# Patient Record
Sex: FEMALE | Race: WHITE
[De-identification: names, ages, dates, MRNs, and addresses within clinical notes are randomized per-mention and may not be internally consistent; named-entity substitution may affect disease eponyms.]

---

## 2018-01-15 ENCOUNTER — HOSPITAL ENCOUNTER (OUTPATIENT)
Dept: HOSPITAL 15 - LAB | Age: 83
Discharge: HOME | End: 2018-01-15
Attending: INTERNAL MEDICINE
Payer: COMMERCIAL

## 2018-01-15 DIAGNOSIS — E78.5: Primary | ICD-10-CM

## 2018-01-15 LAB
ALBUMIN SERPL-MCNC: 3.8 G/DL (ref 3.4–5)
ALP SERPL-CCNC: 78 U/L (ref 45–117)
ALT SERPL-CCNC: 27 U/L (ref 13–56)
ANION GAP SERPL CALCULATED.3IONS-SCNC: 8 MMOL/L (ref 5–15)
BILIRUB SERPL-MCNC: 0.6 MG/DL (ref 0.2–1)
BUN SERPL-MCNC: 19 MG/DL (ref 7–18)
BUN/CREAT SERPL: 22.9
CALCIUM SERPL-MCNC: 9 MG/DL (ref 8.5–10.1)
CHLORIDE SERPL-SCNC: 108 MMOL/L (ref 98–107)
CHOLEST SERPL-MCNC: 166 MG/DL (ref ?–200)
CO2 SERPL-SCNC: 27 MMOL/L (ref 21–32)
GLUCOSE SERPL-MCNC: 102 MG/DL (ref 74–106)
HCT VFR BLD AUTO: 49.5 % (ref 36–46)
HDLC SERPL-MCNC: 75 MG/DL (ref 40–59)
HGB BLD-MCNC: 16 G/DL (ref 12.2–16.2)
MCH RBC QN AUTO: 30.9 PG (ref 28–32)
MCV RBC AUTO: 95.3 FL (ref 80–100)
NRBC BLD QL AUTO: 0.1 %
POTASSIUM SERPL-SCNC: 3.7 MMOL/L (ref 3.5–5.1)
PROT SERPL-MCNC: 7.1 G/DL (ref 6.4–8.2)
SODIUM SERPL-SCNC: 143 MMOL/L (ref 136–145)
TRIGL SERPL-MCNC: 77 MG/DL (ref ?–150)

## 2018-01-15 PROCEDURE — 80053 COMPREHEN METABOLIC PANEL: CPT

## 2018-01-15 PROCEDURE — 36415 COLL VENOUS BLD VENIPUNCTURE: CPT

## 2018-01-15 PROCEDURE — 84443 ASSAY THYROID STIM HORMONE: CPT

## 2018-01-15 PROCEDURE — 80061 LIPID PANEL: CPT

## 2018-01-15 PROCEDURE — 81001 URINALYSIS AUTO W/SCOPE: CPT

## 2018-01-15 PROCEDURE — 85025 COMPLETE CBC W/AUTO DIFF WBC: CPT

## 2018-09-26 ENCOUNTER — HOSPITAL ENCOUNTER (OUTPATIENT)
Dept: HOSPITAL 15 - LAB | Age: 83
Discharge: HOME | End: 2018-09-26
Attending: NURSE PRACTITIONER
Payer: COMMERCIAL

## 2018-09-26 DIAGNOSIS — E55.9: ICD-10-CM

## 2018-09-26 DIAGNOSIS — E78.5: Primary | ICD-10-CM

## 2018-09-26 LAB
ALBUMIN SERPL-MCNC: 3.5 G/DL (ref 3.4–5)
ALP SERPL-CCNC: 77 U/L (ref 45–117)
ALT SERPL-CCNC: 24 U/L (ref 13–56)
ANION GAP SERPL CALCULATED.3IONS-SCNC: 7 MMOL/L (ref 5–15)
BILIRUB SERPL-MCNC: 0.7 MG/DL (ref 0.2–1)
BUN SERPL-MCNC: 12 MG/DL (ref 7–18)
BUN/CREAT SERPL: 15.2
CALCIUM SERPL-MCNC: 9.2 MG/DL (ref 8.5–10.1)
CHLORIDE SERPL-SCNC: 109 MMOL/L (ref 98–107)
CHOLEST SERPL-MCNC: 163 MG/DL (ref ?–200)
CO2 SERPL-SCNC: 27 MMOL/L (ref 21–32)
GLUCOSE SERPL-MCNC: 89 MG/DL (ref 74–106)
HCT VFR BLD AUTO: 49.5 % (ref 36–46)
HDLC SERPL-MCNC: 70 MG/DL (ref 40–59)
HGB BLD-MCNC: 16.4 G/DL (ref 12.2–16.2)
MCH RBC QN AUTO: 31.4 PG (ref 28–32)
MCV RBC AUTO: 94.4 FL (ref 80–100)
NRBC BLD QL AUTO: 0.1 %
POTASSIUM SERPL-SCNC: 3.3 MMOL/L (ref 3.5–5.1)
PROT SERPL-MCNC: 7 G/DL (ref 6.4–8.2)
SODIUM SERPL-SCNC: 143 MMOL/L (ref 136–145)
TRIGL SERPL-MCNC: 54 MG/DL (ref ?–150)

## 2018-09-26 PROCEDURE — 82306 VITAMIN D 25 HYDROXY: CPT

## 2018-09-26 PROCEDURE — 36415 COLL VENOUS BLD VENIPUNCTURE: CPT

## 2018-09-26 PROCEDURE — 85025 COMPLETE CBC W/AUTO DIFF WBC: CPT

## 2018-09-26 PROCEDURE — 83036 HEMOGLOBIN GLYCOSYLATED A1C: CPT

## 2018-09-26 PROCEDURE — 84443 ASSAY THYROID STIM HORMONE: CPT

## 2018-09-26 PROCEDURE — 80053 COMPREHEN METABOLIC PANEL: CPT

## 2018-09-26 PROCEDURE — 80061 LIPID PANEL: CPT

## 2019-03-13 ENCOUNTER — HOSPITAL ENCOUNTER (OUTPATIENT)
Dept: HOSPITAL 15 - LAB | Age: 84
Discharge: HOME | End: 2019-03-13
Attending: NURSE PRACTITIONER
Payer: COMMERCIAL

## 2019-03-13 DIAGNOSIS — E78.5: Primary | ICD-10-CM

## 2019-03-13 LAB
ALBUMIN SERPL-MCNC: 3.4 G/DL (ref 3.4–5)
ALP SERPL-CCNC: 80 U/L (ref 45–117)
ALT SERPL-CCNC: 25 U/L (ref 13–56)
ANION GAP SERPL CALCULATED.3IONS-SCNC: 6 MMOL/L (ref 5–15)
BILIRUB SERPL-MCNC: 0.9 MG/DL (ref 0.2–1)
BUN SERPL-MCNC: 13 MG/DL (ref 7–18)
BUN/CREAT SERPL: 16.7
CALCIUM SERPL-MCNC: 9 MG/DL (ref 8.5–10.1)
CHLORIDE SERPL-SCNC: 108 MMOL/L (ref 98–107)
CHOLEST SERPL-MCNC: 176 MG/DL (ref ?–200)
CO2 SERPL-SCNC: 28 MMOL/L (ref 21–32)
GLUCOSE SERPL-MCNC: 96 MG/DL (ref 74–106)
HCT VFR BLD AUTO: 48.9 % (ref 36–46)
HDLC SERPL-MCNC: 81 MG/DL (ref 40–59)
HGB BLD-MCNC: 16.2 G/DL (ref 12.2–16.2)
MCH RBC QN AUTO: 30.9 PG (ref 28–32)
MCV RBC AUTO: 93.3 FL (ref 80–100)
NRBC BLD QL AUTO: 0 %
POTASSIUM SERPL-SCNC: 3.4 MMOL/L (ref 3.5–5.1)
PROT SERPL-MCNC: 6.9 G/DL (ref 6.4–8.2)
SODIUM SERPL-SCNC: 142 MMOL/L (ref 136–145)
TRIGL SERPL-MCNC: 66 MG/DL (ref ?–150)

## 2019-03-13 PROCEDURE — 80053 COMPREHEN METABOLIC PANEL: CPT

## 2019-03-13 PROCEDURE — 85025 COMPLETE CBC W/AUTO DIFF WBC: CPT

## 2019-03-13 PROCEDURE — 36415 COLL VENOUS BLD VENIPUNCTURE: CPT

## 2019-03-13 PROCEDURE — 81001 URINALYSIS AUTO W/SCOPE: CPT

## 2019-03-13 PROCEDURE — 80061 LIPID PANEL: CPT

## 2019-03-13 PROCEDURE — 83036 HEMOGLOBIN GLYCOSYLATED A1C: CPT

## 2019-03-13 PROCEDURE — 84443 ASSAY THYROID STIM HORMONE: CPT

## 2019-06-25 ENCOUNTER — HOSPITAL ENCOUNTER (OUTPATIENT)
Dept: HOSPITAL 15 - LAB | Age: 84
Discharge: HOME | End: 2019-06-25
Attending: NURSE PRACTITIONER
Payer: COMMERCIAL

## 2019-06-25 DIAGNOSIS — E78.5: Primary | ICD-10-CM

## 2019-06-25 LAB
ALBUMIN SERPL-MCNC: 3.6 G/DL (ref 3.4–5)
ALP SERPL-CCNC: 70 U/L (ref 45–117)
ALT SERPL-CCNC: 22 U/L (ref 13–56)
ANION GAP SERPL CALCULATED.3IONS-SCNC: 7 MMOL/L (ref 5–15)
BILIRUB SERPL-MCNC: 1 MG/DL (ref 0.2–1)
BUN SERPL-MCNC: 12 MG/DL (ref 7–18)
BUN/CREAT SERPL: 15
CALCIUM SERPL-MCNC: 9.6 MG/DL (ref 8.5–10.1)
CHLORIDE SERPL-SCNC: 105 MMOL/L (ref 98–107)
CHOLEST SERPL-MCNC: 174 MG/DL (ref ?–200)
CO2 SERPL-SCNC: 28 MMOL/L (ref 21–32)
GLUCOSE SERPL-MCNC: 111 MG/DL (ref 74–106)
HCT VFR BLD AUTO: 48.3 % (ref 36–46)
HDLC SERPL-MCNC: 86 MG/DL (ref 40–59)
HGB BLD-MCNC: 16.1 G/DL (ref 12.2–16.2)
MCH RBC QN AUTO: 31 PG (ref 28–32)
MCV RBC AUTO: 93.1 FL (ref 80–100)
NRBC BLD QL AUTO: 0.1 %
POTASSIUM SERPL-SCNC: 3.2 MMOL/L (ref 3.5–5.1)
PROT SERPL-MCNC: 6.9 G/DL (ref 6.4–8.2)
SODIUM SERPL-SCNC: 140 MMOL/L (ref 136–145)
TRIGL SERPL-MCNC: 71 MG/DL (ref ?–150)

## 2019-06-25 PROCEDURE — 82607 VITAMIN B-12: CPT

## 2019-06-25 PROCEDURE — 36415 COLL VENOUS BLD VENIPUNCTURE: CPT

## 2019-06-25 PROCEDURE — 82306 VITAMIN D 25 HYDROXY: CPT

## 2019-06-25 PROCEDURE — 80053 COMPREHEN METABOLIC PANEL: CPT

## 2019-06-25 PROCEDURE — 81001 URINALYSIS AUTO W/SCOPE: CPT

## 2019-06-25 PROCEDURE — 82746 ASSAY OF FOLIC ACID SERUM: CPT

## 2019-06-25 PROCEDURE — 85025 COMPLETE CBC W/AUTO DIFF WBC: CPT

## 2019-06-25 PROCEDURE — 80061 LIPID PANEL: CPT

## 2019-06-25 PROCEDURE — 83036 HEMOGLOBIN GLYCOSYLATED A1C: CPT

## 2019-10-01 ENCOUNTER — HOSPITAL ENCOUNTER (OUTPATIENT)
Dept: HOSPITAL 15 - LAB | Age: 84
Discharge: HOME | End: 2019-10-01
Attending: NURSE PRACTITIONER
Payer: COMMERCIAL

## 2019-10-01 DIAGNOSIS — R73.03: ICD-10-CM

## 2019-10-01 DIAGNOSIS — E78.5: Primary | ICD-10-CM

## 2019-10-01 LAB
ALBUMIN SERPL-MCNC: 3.6 G/DL (ref 3.4–5)
ALP SERPL-CCNC: 78 U/L (ref 45–117)
ALT SERPL-CCNC: 28 U/L (ref 13–56)
ANION GAP SERPL CALCULATED.3IONS-SCNC: 6 MMOL/L (ref 5–15)
BILIRUB SERPL-MCNC: 0.9 MG/DL (ref 0.2–1)
BUN SERPL-MCNC: 19 MG/DL (ref 7–18)
BUN/CREAT SERPL: 20.2
CALCIUM SERPL-MCNC: 9.4 MG/DL (ref 8.5–10.1)
CHLORIDE SERPL-SCNC: 108 MMOL/L (ref 98–107)
CO2 SERPL-SCNC: 28 MMOL/L (ref 21–32)
GLUCOSE SERPL-MCNC: 114 MG/DL (ref 74–106)
HCT VFR BLD AUTO: 47.6 % (ref 36–46)
HGB BLD-MCNC: 16.5 G/DL (ref 12.2–16.2)
MCH RBC QN AUTO: 32 PG (ref 28–32)
MCV RBC AUTO: 92.1 FL (ref 80–100)
NRBC BLD QL AUTO: 0.1 %
POTASSIUM SERPL-SCNC: 3.6 MMOL/L (ref 3.5–5.1)
PROT SERPL-MCNC: 7.1 G/DL (ref 6.4–8.2)
SODIUM SERPL-SCNC: 142 MMOL/L (ref 136–145)

## 2019-10-01 PROCEDURE — 80053 COMPREHEN METABOLIC PANEL: CPT

## 2019-10-01 PROCEDURE — 83036 HEMOGLOBIN GLYCOSYLATED A1C: CPT

## 2019-10-01 PROCEDURE — 85025 COMPLETE CBC W/AUTO DIFF WBC: CPT

## 2019-10-01 PROCEDURE — 84443 ASSAY THYROID STIM HORMONE: CPT

## 2019-10-01 PROCEDURE — 36415 COLL VENOUS BLD VENIPUNCTURE: CPT

## 2019-11-18 ENCOUNTER — HOSPITAL ENCOUNTER (INPATIENT)
Dept: HOSPITAL 15 - ER | Age: 84
LOS: 3 days | Discharge: HOME | DRG: 189 | End: 2019-11-21
Attending: INTERNAL MEDICINE | Admitting: NURSE PRACTITIONER
Payer: COMMERCIAL

## 2019-11-18 VITALS — WEIGHT: 134.48 LBS | BODY MASS INDEX: 21.11 KG/M2 | HEIGHT: 67 IN

## 2019-11-18 DIAGNOSIS — R65.10: ICD-10-CM

## 2019-11-18 DIAGNOSIS — D69.6: ICD-10-CM

## 2019-11-18 DIAGNOSIS — E07.9: ICD-10-CM

## 2019-11-18 DIAGNOSIS — I48.20: ICD-10-CM

## 2019-11-18 DIAGNOSIS — E78.00: ICD-10-CM

## 2019-11-18 DIAGNOSIS — E78.5: ICD-10-CM

## 2019-11-18 DIAGNOSIS — M19.90: ICD-10-CM

## 2019-11-18 DIAGNOSIS — I10: ICD-10-CM

## 2019-11-18 DIAGNOSIS — G93.41: ICD-10-CM

## 2019-11-18 DIAGNOSIS — Z86.73: ICD-10-CM

## 2019-11-18 DIAGNOSIS — J96.21: Primary | ICD-10-CM

## 2019-11-18 DIAGNOSIS — Z79.01: ICD-10-CM

## 2019-11-18 DIAGNOSIS — Z79.899: ICD-10-CM

## 2019-11-18 LAB
ALBUMIN SERPL-MCNC: 3.9 G/DL (ref 3.4–5)
ALP SERPL-CCNC: 71 U/L (ref 45–117)
ALT SERPL-CCNC: 33 U/L (ref 13–56)
ANION GAP SERPL CALCULATED.3IONS-SCNC: 7 MMOL/L (ref 5–15)
APTT PPP: 37.6 SEC (ref 23.64–32.05)
BILIRUB SERPL-MCNC: 0.6 MG/DL (ref 0.2–1)
BUN SERPL-MCNC: 14 MG/DL (ref 7–18)
BUN/CREAT SERPL: 14.4
CALCIUM SERPL-MCNC: 9 MG/DL (ref 8.5–10.1)
CHLORIDE SERPL-SCNC: 99 MMOL/L (ref 98–107)
CO2 SERPL-SCNC: 30 MMOL/L (ref 21–32)
GLUCOSE SERPL-MCNC: 125 MG/DL (ref 74–106)
HCT VFR BLD AUTO: 49.2 % (ref 36–46)
HGB BLD-MCNC: 16.5 G/DL (ref 12.2–16.2)
INR PPP: 1.78 (ref 0.9–1.15)
MCH RBC QN AUTO: 31.3 PG (ref 28–32)
MCV RBC AUTO: 93.4 FL (ref 80–100)
NRBC BLD QL AUTO: 0.1 %
POTASSIUM SERPL-SCNC: 3.5 MMOL/L (ref 3.5–5.1)
PROT SERPL-MCNC: 7.8 G/DL (ref 6.4–8.2)
SODIUM SERPL-SCNC: 136 MMOL/L (ref 136–145)

## 2019-11-18 PROCEDURE — 83880 ASSAY OF NATRIURETIC PEPTIDE: CPT

## 2019-11-18 PROCEDURE — 36600 WITHDRAWAL OF ARTERIAL BLOOD: CPT

## 2019-11-18 PROCEDURE — 36415 COLL VENOUS BLD VENIPUNCTURE: CPT

## 2019-11-18 PROCEDURE — 80048 BASIC METABOLIC PNL TOTAL CA: CPT

## 2019-11-18 PROCEDURE — 87040 BLOOD CULTURE FOR BACTERIA: CPT

## 2019-11-18 PROCEDURE — 85025 COMPLETE CBC W/AUTO DIFF WBC: CPT

## 2019-11-18 PROCEDURE — 85730 THROMBOPLASTIN TIME PARTIAL: CPT

## 2019-11-18 PROCEDURE — 85610 PROTHROMBIN TIME: CPT

## 2019-11-18 PROCEDURE — 87086 URINE CULTURE/COLONY COUNT: CPT

## 2019-11-18 PROCEDURE — 84484 ASSAY OF TROPONIN QUANT: CPT

## 2019-11-18 PROCEDURE — 80053 COMPREHEN METABOLIC PANEL: CPT

## 2019-11-18 PROCEDURE — 70450 CT HEAD/BRAIN W/O DYE: CPT

## 2019-11-18 PROCEDURE — 83605 ASSAY OF LACTIC ACID: CPT

## 2019-11-18 PROCEDURE — 93005 ELECTROCARDIOGRAM TRACING: CPT

## 2019-11-18 PROCEDURE — 81001 URINALYSIS AUTO W/SCOPE: CPT

## 2019-11-18 PROCEDURE — 82805 BLOOD GASES W/O2 SATURATION: CPT

## 2019-11-18 PROCEDURE — 71045 X-RAY EXAM CHEST 1 VIEW: CPT

## 2019-11-19 VITALS — DIASTOLIC BLOOD PRESSURE: 73 MMHG | SYSTOLIC BLOOD PRESSURE: 134 MMHG

## 2019-11-19 VITALS — DIASTOLIC BLOOD PRESSURE: 51 MMHG | SYSTOLIC BLOOD PRESSURE: 98 MMHG

## 2019-11-19 VITALS — DIASTOLIC BLOOD PRESSURE: 61 MMHG | SYSTOLIC BLOOD PRESSURE: 117 MMHG

## 2019-11-19 VITALS — SYSTOLIC BLOOD PRESSURE: 106 MMHG | DIASTOLIC BLOOD PRESSURE: 62 MMHG

## 2019-11-19 VITALS — SYSTOLIC BLOOD PRESSURE: 127 MMHG | DIASTOLIC BLOOD PRESSURE: 51 MMHG

## 2019-11-19 VITALS — DIASTOLIC BLOOD PRESSURE: 52 MMHG | SYSTOLIC BLOOD PRESSURE: 118 MMHG

## 2019-11-19 RX ADMIN — CEFTRIAXONE SODIUM SCH MLS/HR: 1 INJECTION, POWDER, FOR SOLUTION INTRAMUSCULAR; INTRAVENOUS at 03:45

## 2019-11-19 RX ADMIN — LEVOTHYROXINE SODIUM SCH MCG: 25 TABLET ORAL at 08:34

## 2019-11-19 RX ADMIN — ACETAMINOPHEN PRN MG: 325 TABLET ORAL at 08:36

## 2019-11-19 RX ADMIN — DOXYCYCLINE SCH MG: 100 TABLET ORAL at 11:49

## 2019-11-19 RX ADMIN — METOPROLOL SUCCINATE SCH MG: 50 TABLET, EXTENDED RELEASE ORAL at 09:52

## 2019-11-19 RX ADMIN — FAMOTIDINE SCH MG: 20 TABLET, FILM COATED ORAL at 09:51

## 2019-11-19 RX ADMIN — ACETAMINOPHEN PRN MG: 325 TABLET ORAL at 14:57

## 2019-11-19 RX ADMIN — ATORVASTATIN CALCIUM SCH MG: 20 TABLET, FILM COATED ORAL at 21:36

## 2019-11-19 RX ADMIN — LEVOTHYROXINE SODIUM SCH MCG: 100 TABLET ORAL at 08:34

## 2019-11-19 RX ADMIN — DOXYCYCLINE SCH MG: 100 TABLET ORAL at 21:36

## 2019-11-19 NOTE — NUR
Respiratory note: ASSESSED PT FOR PRN MED NEB AT THIS TIME, PT DENIES SOB AT THIS TIME, NO 
RESP DISTRESS NOTED, NO TX INDICATED. PULSE OX 93% ON 3LNC, HR 73, RR 20, BILATERAL BS 
CLEAR.

## 2019-11-19 NOTE — NUR
URINE OUTPUT

TOTAL URINARY BAG OUTPUT - 8250ML 

CBI IRRIGANT INFUSED          - 6000ML

NET URINE OUTPUT  - 2250ML

-------------------------------------------------------------------------------

Addendum: 11/19/19 at 0644 by SJ TELLEZ RN RN

-------------------------------------------------------------------------------

WRONG ENTRY

## 2019-11-19 NOTE — NUR
Temp Remains Elevated

   The patient temperature remain around 100.0. Despite ice packs I could only get her temp 
as low as 99.5. I found her asleep without the ice packs, checked her temp at 1450hrs, and 
she increased to 102.1. Gave the patient another dose of Tylenol and replaced the ice packs. 
Will continue to monitor.

## 2019-11-19 NOTE — NUR
MS admit from ER

MARIUM MARI admitted to tele/MS after SBAR received.  Patient oriented to SJ TELLEZ, RN primary RN, unit, room, bed, and unit policies regarding patient care and visiting 
hours. Patient weighed by bedscale and encouraged to call if they need something. All 
questions and concerns addressed, patient verbalized understanding. 

Note:

## 2019-11-19 NOTE — NUR
Elevated Temp

   The patient had an elevate temp of 101.2 this morning. Gave the patient Tylenol and 
reassess at 0930, temp lowered slightly to 101.1. Started cooling measures and at 1030hrs, 
the patient's temp decreased to 99.5. Dr. Juarez was informed. Will continue to monitor.

## 2019-11-19 NOTE — NUR
Normal Temp

   The patient's temperature finally came down to normal range. It was 98.4 at 1700hrs. 
Removed the cooling measures and informed the patient we will continue to monitor regularly.

## 2019-11-19 NOTE — NUR
Opening Shift Note

Assumed care of patient, awake, AAOx4.  No S/S of distress/SOB or pain.  On 4L oxygen via 
nasal cannula, Ambulatory to bedside commode.  Bed in lowest locked position, side rails up 
x2, call light within reach.  Instructed on POC and to call for assist PRN, will continue to 
monitor for changes Q1hr and PRN.

## 2019-11-20 VITALS — SYSTOLIC BLOOD PRESSURE: 99 MMHG | DIASTOLIC BLOOD PRESSURE: 49 MMHG

## 2019-11-20 VITALS — DIASTOLIC BLOOD PRESSURE: 61 MMHG | SYSTOLIC BLOOD PRESSURE: 104 MMHG

## 2019-11-20 VITALS — DIASTOLIC BLOOD PRESSURE: 51 MMHG | SYSTOLIC BLOOD PRESSURE: 99 MMHG

## 2019-11-20 VITALS — DIASTOLIC BLOOD PRESSURE: 62 MMHG | SYSTOLIC BLOOD PRESSURE: 119 MMHG

## 2019-11-20 LAB
ANION GAP SERPL CALCULATED.3IONS-SCNC: 10 MMOL/L (ref 5–15)
BUN SERPL-MCNC: 11 MG/DL (ref 7–18)
BUN/CREAT SERPL: 17.5
CALCIUM SERPL-MCNC: 8.2 MG/DL (ref 8.5–10.1)
CHLORIDE SERPL-SCNC: 102 MMOL/L (ref 98–107)
CO2 SERPL-SCNC: 26 MMOL/L (ref 21–32)
GLUCOSE SERPL-MCNC: 119 MG/DL (ref 74–106)
HCT VFR BLD AUTO: 44.2 % (ref 36–46)
HGB BLD-MCNC: 14.8 G/DL (ref 12.2–16.2)
INR PPP: 2.97 (ref 0.9–1.15)
MCH RBC QN AUTO: 31.1 PG (ref 28–32)
MCV RBC AUTO: 93 FL (ref 80–100)
NRBC BLD QL AUTO: 0.1 %
POTASSIUM SERPL-SCNC: 2.9 MMOL/L (ref 3.5–5.1)
SODIUM SERPL-SCNC: 138 MMOL/L (ref 136–145)

## 2019-11-20 RX ADMIN — DOXYCYCLINE SCH MG: 100 TABLET ORAL at 08:52

## 2019-11-20 RX ADMIN — LEVOTHYROXINE SODIUM SCH MCG: 100 TABLET ORAL at 06:31

## 2019-11-20 RX ADMIN — ATORVASTATIN CALCIUM SCH MG: 20 TABLET, FILM COATED ORAL at 21:15

## 2019-11-20 RX ADMIN — LEVOTHYROXINE SODIUM SCH MCG: 25 TABLET ORAL at 06:31

## 2019-11-20 RX ADMIN — FAMOTIDINE SCH MG: 20 TABLET, FILM COATED ORAL at 08:51

## 2019-11-20 RX ADMIN — DOXYCYCLINE SCH MG: 100 TABLET ORAL at 21:14

## 2019-11-20 RX ADMIN — CEFTRIAXONE SODIUM SCH MLS/HR: 1 INJECTION, POWDER, FOR SOLUTION INTRAMUSCULAR; INTRAVENOUS at 03:02

## 2019-11-20 RX ADMIN — METOPROLOL SUCCINATE SCH MG: 50 TABLET, EXTENDED RELEASE ORAL at 08:52

## 2019-11-20 NOTE — NUR
Respiratory note: ASSESSED PT FOR PRN MED NEB AT THIS TIME, PT DENIES SOB AT THIS TIME, NO 
RESP DISTRESS NOTED, NO TX INDICATED. PULSE OX 90% ON 0.5LNC, INCREASED FIO2 TO 1.5LNC AT 
THIS TIME. HR 89, RR 18, BILATERAL BS CLEAR.

## 2019-11-20 NOTE — NUR
Opening Shift Note

Assumed care of patient, awake, AAOx4.  No S/S of distress/SOB or pain.  On room air and 
ambulatory to bedside commode.  Bed in lowest locked position, side rails up x2, call light 
within reach.  Instructed on POC and to call for assist PRN, will continue to monitor for 
changes Q1hr and PRN.

## 2019-11-20 NOTE — NUR
Respiratory note:

ASSESSED PT FOR PRN TX, PT WAS AWAKE AND ALERT, NO RESP DISTRESS NOTED. HR 68, RR 18, SPO2 
85% ON ROOM AIR. PT STATED SHE DID NOT FEEL SHORT OF BREATH AND SAID SHE TOOK CANNULA OFF TO 
EAT BREAKFAST BECAUSE IT KEPT FALLING OFF. I PLACED PT BACK ON 3L N/C. AND ADVISED PATIENT 
TO KEEP OXYGEN ON. PT KNOWS TO HAVE RT PAGED IF TX IS NEEDED.

## 2019-11-20 NOTE — NUR
RECEIVED REPORT AND ASSUMED CARE OF PT. A/OX4. DENIED S/S ACUTE DISTRESS. UPDATE PT WITH 
POC. BED AT LOWEST POSITION. CALL LIGHT AND BELONGINGS WITHIN REACH. WILL CONT TO MONITOR.

## 2019-11-21 VITALS — SYSTOLIC BLOOD PRESSURE: 92 MMHG | DIASTOLIC BLOOD PRESSURE: 59 MMHG

## 2019-11-21 VITALS — DIASTOLIC BLOOD PRESSURE: 48 MMHG | SYSTOLIC BLOOD PRESSURE: 111 MMHG

## 2019-11-21 VITALS — SYSTOLIC BLOOD PRESSURE: 113 MMHG | DIASTOLIC BLOOD PRESSURE: 44 MMHG

## 2019-11-21 VITALS — DIASTOLIC BLOOD PRESSURE: 44 MMHG | SYSTOLIC BLOOD PRESSURE: 113 MMHG

## 2019-11-21 VITALS — SYSTOLIC BLOOD PRESSURE: 118 MMHG | DIASTOLIC BLOOD PRESSURE: 72 MMHG

## 2019-11-21 VITALS — SYSTOLIC BLOOD PRESSURE: 115 MMHG | DIASTOLIC BLOOD PRESSURE: 82 MMHG

## 2019-11-21 LAB
ANION GAP SERPL CALCULATED.3IONS-SCNC: 5 MMOL/L (ref 5–15)
BUN SERPL-MCNC: 15 MG/DL (ref 7–18)
BUN/CREAT SERPL: 23.4
CALCIUM SERPL-MCNC: 8.2 MG/DL (ref 8.5–10.1)
CHLORIDE SERPL-SCNC: 108 MMOL/L (ref 98–107)
CO2 SERPL-SCNC: 27 MMOL/L (ref 21–32)
GLUCOSE SERPL-MCNC: 103 MG/DL (ref 74–106)
HCT VFR BLD AUTO: 43.1 % (ref 36–46)
HGB BLD-MCNC: 14.4 G/DL (ref 12.2–16.2)
INR PPP: 2.73 (ref 0.9–1.15)
MCH RBC QN AUTO: 31.4 PG (ref 28–32)
MCV RBC AUTO: 93.8 FL (ref 80–100)
NRBC BLD QL AUTO: 0.1 %
POTASSIUM SERPL-SCNC: 4.1 MMOL/L (ref 3.5–5.1)
SODIUM SERPL-SCNC: 140 MMOL/L (ref 136–145)

## 2019-11-21 RX ADMIN — METOPROLOL SUCCINATE SCH MG: 50 TABLET, EXTENDED RELEASE ORAL at 10:32

## 2019-11-21 RX ADMIN — LEVOTHYROXINE SODIUM SCH MCG: 100 TABLET ORAL at 07:00

## 2019-11-21 RX ADMIN — DOXYCYCLINE SCH MG: 100 TABLET ORAL at 10:30

## 2019-11-21 RX ADMIN — FAMOTIDINE SCH MG: 20 TABLET, FILM COATED ORAL at 10:31

## 2019-11-21 RX ADMIN — CEFTRIAXONE SODIUM SCH MLS/HR: 1 INJECTION, POWDER, FOR SOLUTION INTRAMUSCULAR; INTRAVENOUS at 03:18

## 2019-11-21 RX ADMIN — LEVOTHYROXINE SODIUM SCH MCG: 25 TABLET ORAL at 07:00

## 2019-11-21 NOTE — NUR
DR. RANDHAWA NOTIFIED THAT THE PATIENTS O2 ON RA WAS 86% AND AFTER PLACING PT ON 2L NC THE 
PATIENT 02 WAS 90%. MD AWARE, NO NEW ORDERS RECEIVED AT THIS TIME.

## 2019-11-21 NOTE — NUR
Discharge planning per  consult, patient has orders for home 02. Referral was sent to [facility]. 
Paced a follow up call, spoke with Aj and was advised that auth was obtained and 
scheduled delivery time is between 4:30-7:30pm. Patient was advised. Nurse Roy also 
updated on dc plan and 02 delivery. 

-------------------------------------------------------------------------------

Addendum: 11/21/19 at 1601 by AJ ZAPATA 

-------------------------------------------------------------------------------

Amended: Links added.

## 2019-11-21 NOTE — NUR
Opening Shift Note

Assumed care of patient, awake and alert. No S/S of distress/SOB. Pt denies having any pain 
at this time. Bed in lowest and locked position with side rails upx2 and call light in 
reach. Instructed on POC and to call for assist PRN, will continue to monitor for changes 
Q1hr and PRN.

## 2019-11-21 NOTE — NUR
Discharge instructions given as ordered. Encourage to follow up with PMD as instructed. All 
questions and concerns addressed. Patient verbalized understanding.  Medication 
reconciliation form completed and copy given to patient.  IV removed with catheter intact, 
pressure dressing applied.  Oxygen was delivered bedside prior to discharge.  Patient taken 
to vehicle via wheelchair with all personal belongings, accompanied by staff and family 
member. No distress noted at time of departure.

## 2019-11-21 NOTE — NUR
Respiratory note:

ASSESSED PT FOR PRN TX. NOT INDICATED AT THIS TIME. HR 76, SPO2 91% 2LNC, RR 16. BS 
DIMINISHED T/O. NO RESPIRATORY DISTRESS NOTED AT THIS TIME. PT KNOWS TO HAVE RT PAGED IF TX 
NEEDED.

## 2019-11-21 NOTE — NUR
assessment

Patient is a 84 year old female who is alert and oriented. Prior to admission patient lived 
home alone and her son Delonte lives on the property behind her. Patient is independent in her 
ADL's. Patient informed me she is able to care for her own ADLs.  Per patient she will 
return home to her prior living arrangements post discharge and family will transport her 
home. Patient see's Les for her PCP. Patient has a wheelchair and fww for home use. Patient 
feels safe returning home on discharge. Patient has no post discharge needs identified. I 
informed patient she has a right to speak to a  regarding all care. I informed 
patient she has a right to participate in any and all discharge planning. Patient does not 
have a POA and advanced directive. I have offered patient information on POA and advanced 
directives. I informed the patient the advantages and benefits of having an Advanced 
Directive. Patient verbalized understanding and agreed to discharge plan.

-------------------------------------------------------------------------------

Addendum: 11/21/19 at 1544 by Anay MARSHALL

-------------------------------------------------------------------------------

Amended: Links added.

## 2020-03-03 ENCOUNTER — HOSPITAL ENCOUNTER (OUTPATIENT)
Dept: HOSPITAL 15 - LAB | Age: 85
Discharge: HOME | End: 2020-03-03
Attending: NURSE PRACTITIONER
Payer: COMMERCIAL

## 2020-03-03 DIAGNOSIS — E78.5: ICD-10-CM

## 2020-03-03 DIAGNOSIS — Z00.00: Primary | ICD-10-CM

## 2020-03-03 DIAGNOSIS — R73.9: ICD-10-CM

## 2020-03-03 LAB
ALBUMIN SERPL-MCNC: 3.4 G/DL (ref 3.4–5)
ALP SERPL-CCNC: 91 U/L (ref 45–117)
ALT SERPL-CCNC: 30 U/L (ref 13–56)
ANION GAP SERPL CALCULATED.3IONS-SCNC: 6 MMOL/L (ref 5–15)
BILIRUB SERPL-MCNC: 0.6 MG/DL (ref 0.2–1)
BUN SERPL-MCNC: 16 MG/DL (ref 7–18)
BUN/CREAT SERPL: 20.5
CALCIUM SERPL-MCNC: 9.3 MG/DL (ref 8.5–10.1)
CHLORIDE SERPL-SCNC: 108 MMOL/L (ref 98–107)
CHOLEST SERPL-MCNC: 190 MG/DL (ref ?–200)
CO2 SERPL-SCNC: 28 MMOL/L (ref 21–32)
GLUCOSE SERPL-MCNC: 93 MG/DL (ref 74–106)
HCT VFR BLD AUTO: 47.2 % (ref 36–46)
HDLC SERPL-MCNC: 73 MG/DL (ref 40–59)
HGB BLD-MCNC: 15.8 G/DL (ref 12.2–16.2)
MCH RBC QN AUTO: 31.3 PG (ref 28–32)
MCV RBC AUTO: 93.4 FL (ref 80–100)
NRBC BLD QL AUTO: 0.1 %
POTASSIUM SERPL-SCNC: 3.5 MMOL/L (ref 3.5–5.1)
PROT SERPL-MCNC: 7.3 G/DL (ref 6.4–8.2)
SODIUM SERPL-SCNC: 142 MMOL/L (ref 136–145)
TRIGL SERPL-MCNC: 105 MG/DL (ref ?–150)

## 2020-03-03 PROCEDURE — 83036 HEMOGLOBIN GLYCOSYLATED A1C: CPT

## 2020-03-03 PROCEDURE — 85025 COMPLETE CBC W/AUTO DIFF WBC: CPT

## 2020-03-03 PROCEDURE — 80061 LIPID PANEL: CPT

## 2020-03-03 PROCEDURE — 80053 COMPREHEN METABOLIC PANEL: CPT

## 2020-03-03 PROCEDURE — 84443 ASSAY THYROID STIM HORMONE: CPT

## 2020-03-03 PROCEDURE — 81001 URINALYSIS AUTO W/SCOPE: CPT

## 2020-03-03 PROCEDURE — 36415 COLL VENOUS BLD VENIPUNCTURE: CPT

## 2021-06-23 ENCOUNTER — HOSPITAL ENCOUNTER (OUTPATIENT)
Dept: HOSPITAL 15 - LAB | Age: 86
Discharge: HOME | End: 2021-06-23
Attending: NURSE PRACTITIONER
Payer: COMMERCIAL

## 2021-06-23 DIAGNOSIS — E78.5: Primary | ICD-10-CM

## 2021-06-23 DIAGNOSIS — E30.9: ICD-10-CM

## 2021-06-23 LAB
ALBUMIN SERPL-MCNC: 3.5 G/DL (ref 3.4–5)
ALP SERPL-CCNC: 67 U/L (ref 45–117)
ALT SERPL-CCNC: 28 U/L (ref 13–56)
ANION GAP SERPL CALCULATED.3IONS-SCNC: 5 MMOL/L (ref 5–15)
BILIRUB SERPL-MCNC: 0.8 MG/DL (ref 0.2–1)
BUN SERPL-MCNC: 18 MG/DL (ref 7–18)
BUN/CREAT SERPL: 19.4
CALCIUM SERPL-MCNC: 9.4 MG/DL (ref 8.5–10.1)
CHLORIDE SERPL-SCNC: 109 MMOL/L (ref 98–107)
CHOLEST SERPL-MCNC: 172 MG/DL (ref ?–200)
CO2 SERPL-SCNC: 28 MMOL/L (ref 21–32)
GLUCOSE SERPL-MCNC: 95 MG/DL (ref 74–106)
HCT VFR BLD AUTO: 43 % (ref 36–46)
HDLC SERPL-MCNC: 68 MG/DL (ref 40–59)
HGB BLD-MCNC: 14.5 G/DL (ref 12.2–16.2)
MCH RBC QN AUTO: 31.1 PG (ref 28–32)
MCV RBC AUTO: 92.5 FL (ref 80–100)
NRBC BLD QL AUTO: 0 %
POTASSIUM SERPL-SCNC: 3.9 MMOL/L (ref 3.5–5.1)
PROT SERPL-MCNC: 6.9 G/DL (ref 6.4–8.2)
SODIUM SERPL-SCNC: 142 MMOL/L (ref 136–145)
TRIGL SERPL-MCNC: 71 MG/DL (ref ?–150)

## 2021-06-23 PROCEDURE — 81001 URINALYSIS AUTO W/SCOPE: CPT

## 2021-06-23 PROCEDURE — 80061 LIPID PANEL: CPT

## 2021-06-23 PROCEDURE — 84443 ASSAY THYROID STIM HORMONE: CPT

## 2021-06-23 PROCEDURE — 85049 AUTOMATED PLATELET COUNT: CPT

## 2021-06-23 PROCEDURE — 36415 COLL VENOUS BLD VENIPUNCTURE: CPT

## 2021-06-23 PROCEDURE — 80053 COMPREHEN METABOLIC PANEL: CPT

## 2021-06-23 PROCEDURE — 85025 COMPLETE CBC W/AUTO DIFF WBC: CPT

## 2021-10-11 ENCOUNTER — HOSPITAL ENCOUNTER (OUTPATIENT)
Dept: HOSPITAL 15 - LAB | Age: 86
Discharge: HOME | End: 2021-10-11
Attending: NURSE PRACTITIONER
Payer: COMMERCIAL

## 2021-10-11 DIAGNOSIS — I10: Primary | ICD-10-CM

## 2021-10-11 DIAGNOSIS — E03.9: ICD-10-CM

## 2021-10-11 DIAGNOSIS — E78.5: ICD-10-CM

## 2021-10-11 LAB
ALBUMIN SERPL-MCNC: 3.4 G/DL (ref 3.4–5)
ALP SERPL-CCNC: 65 U/L (ref 45–117)
ALT SERPL-CCNC: 24 U/L (ref 13–56)
ANION GAP SERPL CALCULATED.3IONS-SCNC: 7 MMOL/L (ref 5–15)
BILIRUB SERPL-MCNC: 0.5 MG/DL (ref 0.2–1)
BUN SERPL-MCNC: 21 MG/DL (ref 7–18)
BUN/CREAT SERPL: 19.3
CALCIUM SERPL-MCNC: 9.5 MG/DL (ref 8.5–10.1)
CHLORIDE SERPL-SCNC: 112 MMOL/L (ref 98–107)
CHOLEST SERPL-MCNC: 172 MG/DL (ref ?–200)
CO2 SERPL-SCNC: 23 MMOL/L (ref 21–32)
GLUCOSE SERPL-MCNC: 106 MG/DL (ref 74–106)
HCT VFR BLD AUTO: 42.1 % (ref 36–46)
HDLC SERPL-MCNC: 69 MG/DL (ref 40–59)
HGB BLD-MCNC: 13.7 G/DL (ref 12.2–16.2)
MCH RBC QN AUTO: 29.9 PG (ref 28–32)
MCV RBC AUTO: 92 FL (ref 80–100)
NRBC BLD QL AUTO: 0 %
POTASSIUM SERPL-SCNC: 3.8 MMOL/L (ref 3.5–5.1)
PROT SERPL-MCNC: 6.5 G/DL (ref 6.4–8.2)
SODIUM SERPL-SCNC: 142 MMOL/L (ref 136–145)
TRIGL SERPL-MCNC: 91 MG/DL (ref ?–150)

## 2021-10-11 PROCEDURE — 80061 LIPID PANEL: CPT

## 2021-10-11 PROCEDURE — 84443 ASSAY THYROID STIM HORMONE: CPT

## 2021-10-11 PROCEDURE — 36415 COLL VENOUS BLD VENIPUNCTURE: CPT

## 2021-10-11 PROCEDURE — 85025 COMPLETE CBC W/AUTO DIFF WBC: CPT

## 2021-10-11 PROCEDURE — 80053 COMPREHEN METABOLIC PANEL: CPT

## 2022-06-06 ENCOUNTER — HOSPITAL ENCOUNTER (OUTPATIENT)
Dept: HOSPITAL 15 - LAB | Age: 87
Discharge: HOME | End: 2022-06-06
Attending: PSYCHIATRY & NEUROLOGY
Payer: COMMERCIAL

## 2022-06-06 DIAGNOSIS — R41.3: Primary | ICD-10-CM

## 2022-06-06 PROCEDURE — 82746 ASSAY OF FOLIC ACID SERUM: CPT

## 2022-06-06 PROCEDURE — 82607 VITAMIN B-12: CPT
